# Patient Record
Sex: FEMALE | Race: WHITE | Employment: FULL TIME | URBAN - METROPOLITAN AREA
[De-identification: names, ages, dates, MRNs, and addresses within clinical notes are randomized per-mention and may not be internally consistent; named-entity substitution may affect disease eponyms.]

---

## 2017-05-24 ENCOUNTER — TRANSFERRED RECORDS (OUTPATIENT)
Dept: HEALTH INFORMATION MANAGEMENT | Facility: CLINIC | Age: 67
End: 2017-05-24

## 2017-08-01 ENCOUNTER — HOSPITAL ENCOUNTER (OUTPATIENT)
Facility: CLINIC | Age: 67
Setting detail: SPECIMEN
Discharge: HOME | End: 2017-08-01
Attending: INTERNAL MEDICINE | Admitting: INTERNAL MEDICINE
Payer: COMMERCIAL

## 2017-08-01 ENCOUNTER — ONCOLOGY VISIT (OUTPATIENT)
Dept: ONCOLOGY | Facility: CLINIC | Age: 67
End: 2017-08-01
Attending: INTERNAL MEDICINE
Payer: COMMERCIAL

## 2017-08-01 DIAGNOSIS — I82.90 VTE (VENOUS THROMBOEMBOLISM): ICD-10-CM

## 2017-08-01 LAB
ALBUMIN SERPL-MCNC: 3.8 G/DL (ref 3.4–5)
ALP SERPL-CCNC: 83 U/L (ref 40–150)
ALT SERPL W P-5'-P-CCNC: 34 U/L (ref 0–50)
ANION GAP SERPL CALCULATED.3IONS-SCNC: 5 MMOL/L (ref 3–14)
AST SERPL W P-5'-P-CCNC: 25 U/L (ref 0–45)
BASOPHILS # BLD AUTO: 0.1 10E9/L (ref 0–0.2)
BASOPHILS NFR BLD AUTO: 1.3 %
BILIRUB SERPL-MCNC: 0.5 MG/DL (ref 0.2–1.3)
BUN SERPL-MCNC: 15 MG/DL (ref 7–30)
CALCIUM SERPL-MCNC: 9.1 MG/DL (ref 8.5–10.1)
CHLORIDE SERPL-SCNC: 106 MMOL/L (ref 94–109)
CO2 SERPL-SCNC: 30 MMOL/L (ref 20–32)
CREAT SERPL-MCNC: 0.81 MG/DL (ref 0.52–1.04)
D DIMER PPP FEU-MCNC: 0.3 UG/ML FEU (ref 0–0.5)
DIFFERENTIAL METHOD BLD: ABNORMAL
EOSINOPHIL # BLD AUTO: 0.1 10E9/L (ref 0–0.7)
EOSINOPHIL NFR BLD AUTO: 2.6 %
ERYTHROCYTE [DISTWIDTH] IN BLOOD BY AUTOMATED COUNT: 12.8 % (ref 10–15)
GFR SERPL CREATININE-BSD FRML MDRD: 71 ML/MIN/1.7M2
GLUCOSE SERPL-MCNC: 97 MG/DL (ref 70–99)
HCT VFR BLD AUTO: 36.8 % (ref 35–47)
HGB BLD-MCNC: 12 G/DL (ref 11.7–15.7)
IMM GRANULOCYTES # BLD: 0 10E9/L (ref 0–0.4)
IMM GRANULOCYTES NFR BLD: 0.2 %
LYMPHOCYTES # BLD AUTO: 1.7 10E9/L (ref 0.8–5.3)
LYMPHOCYTES NFR BLD AUTO: 37.4 %
MCH RBC QN AUTO: 32.1 PG (ref 26.5–33)
MCHC RBC AUTO-ENTMCNC: 32.6 G/DL (ref 31.5–36.5)
MCV RBC AUTO: 98 FL (ref 78–100)
MONOCYTES # BLD AUTO: 0.4 10E9/L (ref 0–1.3)
MONOCYTES NFR BLD AUTO: 7.9 %
NEUTROPHILS # BLD AUTO: 2.3 10E9/L (ref 1.6–8.3)
NEUTROPHILS NFR BLD AUTO: 50.6 %
NRBC # BLD AUTO: 0 10*3/UL
NRBC BLD AUTO-RTO: 0 /100
PLATELET # BLD AUTO: 228 10E9/L (ref 150–450)
POTASSIUM SERPL-SCNC: 4.5 MMOL/L (ref 3.4–5.3)
PROT SERPL-MCNC: 6.5 G/DL (ref 6.8–8.8)
RBC # BLD AUTO: 3.74 10E12/L (ref 3.8–5.2)
SODIUM SERPL-SCNC: 141 MMOL/L (ref 133–144)
WBC # BLD AUTO: 4.6 10E9/L (ref 4–11)

## 2017-08-01 PROCEDURE — 36415 COLL VENOUS BLD VENIPUNCTURE: CPT

## 2017-08-01 PROCEDURE — 85379 FIBRIN DEGRADATION QUANT: CPT | Performed by: INTERNAL MEDICINE

## 2017-08-01 PROCEDURE — 80053 COMPREHEN METABOLIC PANEL: CPT | Performed by: INTERNAL MEDICINE

## 2017-08-01 PROCEDURE — 85025 COMPLETE CBC W/AUTO DIFF WBC: CPT | Performed by: INTERNAL MEDICINE

## 2017-08-01 NOTE — PROGRESS NOTES
Medical Assistant Note:  Keyur Mina presents today for Blood draw.    Patient seen by provider today: No.   present during visit today: Not Applicable.    Concerns: No Concerns.    Procedure:  Labs drawn: Yes.    Post Assessment:  Labs drawn without difficulty: Yes.    Discharge Plan:  Patient discharged in stable condition accompanied by: self.    Face to Face Time: 10 mins.    Angella Gracia

## 2017-08-01 NOTE — MR AVS SNAPSHOT
After Visit Summary   8/1/2017    Keyur Mina    MRN: [id number]           Patient Information     Date Of Birth          1950        Visit Information        Provider Department      8/1/2017 10:00 AM  ONCOLOGY NURSE [facility]        Today's Diagnoses     VTE (venous thromboembolism)           Follow-ups after your visit        Your next 10 appointments already scheduled     Aug 07, 2017 12:30 PM CDT   Return Visit with Marquita Walker MD   [facility] ([facility])    [facility]  [address]  [address]  [facility] [id number] [phone]              Who to contact     If you have questions or need follow up information about today's clinic visit or your schedule please contact [facility] directly at [phone].  Normal or non-critical lab and imaging results will be communicated to you by MyChart, letter or phone within 4 business days after the clinic has received the results. If you do not hear from us within 7 days, please contact the clinic through [employer] or phone. If you have a critical or abnormal lab result, we will notify you by phone as soon as possible.  Submit refill requests through [employer] or call your pharmacy and they will forward the refill request to us. Please allow 3 business days for your refill to be completed.          Additional Information About Your Visit        MyChart Information     [employer] gives you secure access to your electronic health record. If you see a primary care provider, you can also send messages to your care team and make appointments. If you have questions, please call your primary care clinic.  If you do not have a primary care provider, please call [phone] and they will assist you.        Care EveryWhere ID     This is your Care EveryWhere ID. This could be used by other organizations to access your [facility]  records  [id number]         Blood Pressure from Last 3 Encounters:   08/08/16 120/63   12/28/15 126/68   07/02/15 112/74    Weight from Last 3 Encounters:   08/08/16 63.5 kg (140 lb)   12/28/15 64.3 kg (141 lb 12.8 oz)   07/02/15 60.3 kg (133 lb)              We Performed the Following     CBC with platelets differential     Comprehensive metabolic panel     D dimer quantitative          Today's Medication Changes          These changes are accurate as of: 8/1/17 10:17 AM.  If you have any questions, ask your nurse or doctor.               These medicines have changed or have updated prescriptions.        Dose/Directions    levothyroxine 88 MCG tablet   Commonly known as:  SYNTHROID/LEVOTHROID   This may have changed:    - how much to take  - additional instructions   Used for:  Hypothyroidism        Dose:  88 mcg   Take 1 tablet (88 mcg) by mouth daily Please schedule appointment for further refills.   Quantity:  30 tablet   Refills:  0                Primary Care Provider Office Phone # Fax #    Deb Gordon [phone]       [facility] [address] [facility] [address]        Equal Access to Services     [facility]: Hadii aad ku hadasho Soomaali, waaxda luqadaha, qaybta kaalmada adeegyada, erwin youngblood . So [facility] [phone].    ATENCIÓN: Si habla español, tiene a douglass disposición servicios gratuitos de asistencia lingüística. Llame al [phone].    We comply with applicable federal civil rights laws and [place] laws. We do not discriminate on the basis of race, color, national origin, age, disability sex, sexual orientation or gender identity.            Thank you!     Thank you for choosing [facility]  for your care. Our goal is always to provide you with excellent care. Hearing back from our patients is one way we can continue to improve our services. Please take a few minutes to complete the  written survey that you may receive in the mail after your visit with us. Thank you!             Your Updated Medication List - Protect others around you: Learn how to safely use, store and throw away your medicines at www.disposemymeds.org.          This list is accurate as of: 8/1/17 10:17 AM.  Always use your most recent med list.                   Brand Name Dispense Instructions for use Diagnosis    aspirin 81 MG tablet      Take 162 mg by mouth daily        calcium-vitamin D 500-125 MG-UNIT Tabs      Take by mouth daily        cephALEXin 500 MG capsule    KEFLEX    30 capsule    Take 1 capsule (500 mg) by mouth 3 times daily    Cellulitis       enoxaparin 40 MG/0.4ML injection    LOVENOX    4 Syringe    Inject 0.4 mLs (40 mg) Subcutaneous daily    VTE (venous thromboembolism)       levothyroxine 88 MCG tablet    SYNTHROID/LEVOTHROID    30 tablet    Take 1 tablet (88 mcg) by mouth daily Please schedule appointment for further refills.    Hypothyroidism       omeprazole 40 MG capsule    priLOSEC    90 capsule    Take 1 capsule (40 mg) by mouth daily Take 30-60 minutes before a meal.    GERD (gastroesophageal reflux disease)       VITAMIN B 12 PO      Take by mouth daily

## 2017-08-07 ENCOUNTER — ONCOLOGY VISIT (OUTPATIENT)
Dept: ONCOLOGY | Facility: CLINIC | Age: 67
End: 2017-08-07
Payer: COMMERCIAL

## 2017-08-07 VITALS
DIASTOLIC BLOOD PRESSURE: 63 MMHG | OXYGEN SATURATION: 98 % | HEIGHT: 64 IN | BODY MASS INDEX: 24.28 KG/M2 | HEART RATE: 62 BPM | RESPIRATION RATE: 18 BRPM | WEIGHT: 142.2 LBS | TEMPERATURE: 97.1 F | SYSTOLIC BLOOD PRESSURE: 120 MMHG

## 2017-08-07 DIAGNOSIS — I82.90 VTE (VENOUS THROMBOEMBOLISM): Primary | ICD-10-CM

## 2017-08-07 PROCEDURE — 99213 OFFICE O/P EST LOW 20 MIN: CPT | Performed by: INTERNAL MEDICINE

## 2017-08-07 PROCEDURE — 99211 OFF/OP EST MAY X REQ PHY/QHP: CPT

## 2017-08-07 ASSESSMENT — PAIN SCALES - GENERAL: PAINLEVEL: NO PAIN (0)

## 2017-08-07 NOTE — PROGRESS NOTES
[facility] PHYSICIANS    HEMATOLOGY ONCOLOGY  FOLLOW-UP VISIT NOTE      PATIENT NAME: Keyur Mina MRN # [id number]    PCP- Dr.Christina Gordon  REASON FOR VISIT: DVT, leg, diagnosed on 03/25/2014. This was her second DVT in the same leg.     SUBJECTIVE Patient comes in for a follow up today. She has been feeling well. No new complaints today.     HEMATOLOGY ONCOLOGY HISTORY  Per Dr. Banda's previous note:  Keyur Mina is a 63-year-old patient who 2 years ago in [place] in 11/2011 had a left leg DVT below the knee. That was right after [place] and she has also switched to a very sedentary job. She was given Lovenox for 5 weeks and then placed on Coumadin for 6 months. Records show that on 11/23/2011, the common femoral, superficial femoral and popliteal veins were patent, with no evidence of thrombosis, and most of the calf veins were also patent, but there was a short segment of distended calf vein which contained low-level echoes and no evidence of flow, in keeping with a short segment of thrombosed vessel. The age of this could not be determined. She has also had some hypercoagulable workup in the past. On 03/12/2012, when she was off the Coumadin, she had a protein S total that was 69%, low-normal range, and a free protein S of 50%, slightly below normal. Factor V Leiden mutation was negative. D-dimer was positive. Antithrombin activity was normal along with free protein S and protein C activity. Prothrombin gene mutation was absent. A repeat venous Doppler on 03/25/2014 showed an acute short-segment occlusive thrombus in the left peroneal vein, slight irregularity of the distal femoral vein, either artifact or indicative of chronic nonocclusive DVT, and a superficial thrombus in the proximal greater saphenous vein. Other lab work reveals hemoglobin of 11.6, mildly low. white count, platelet count, differential was normal from  "01/31/2014, and a BMP was normal on that day.   REVIEW OF SYSTEMS  A complete review of systems was performed and found to be negative other then pertinent positives mentioned in HPI.    Past medical, social histories reviewed.    Meds- Reviewed.    PHYSICAL EXAM  /63 (Patient Position: Chair)  Pulse 62  Temp 97.1  F (36.2  C) (Tympanic)  Resp 18  Ht 1.626 m (5' 4\")  Wt 64.5 kg (142 lb 3.2 oz)  SpO2 98%  BMI 24.41 kg/m2  GEN: NAD  HEENT: PERRL, EOMI, no icterus, injection or pallor. Oropharynx is clear.  NECK: no cervical or supraclavicular lymphadenopathy  LUNGS: clear bilaterally  CV: regular, no murmurs.  ABDOMEN: soft, non-tender, non-distended, normal bowel sounds, no hepatosplenomegaly.  EXT: warm, well perfused, no edema  NEURO: alert  SKIN: no rashes    LABORATORY DATA AND IMAGING REVIEWED DURING THIS VISIT:  Recent Labs   Lab Test  08/01/17   1009  08/02/16   1356   NA  141  139   POTASSIUM  4.5  4.1   CHLORIDE  106  105   CO2  30  27   ANIONGAP  5  7   BUN  15  19   CR  0.81  0.79   GLC  97  94   MAYRA  9.1  8.4*     Recent Labs   Lab Test  08/01/17   1009  08/02/16   1356  12/22/15   1010   WBC  4.6  6.2  4.1   HGB  12.0  11.9  12.4   PLT  228  224  279   MCV  98  100  97   NEUTROPHIL  50.6  62.0  50.5     Recent Labs   Lab Test  08/01/17   1009  08/02/16   1356  12/22/15   1010   04/01/14   1530   BILITOTAL  0.5  0.4  0.4   < >  0.2   ALKPHOS  83  81  105   < >  137   ALT  34  25  37   < >  44   AST  25  22  26   < >  39   ALBUMIN  3.8  3.8  3.9   < >  4.2   LDH   --    --    --    --   601    < > = values in this interval not displayed.   Results for JOLIE CARRILLO (MRN [id number]) as of 8/7/2017 12:40   Ref. Range 6/19/2015 10:05 12/22/2015 10:10 8/2/2016 13:56 8/1/2017 10:09   D-Dimer Latest Ref Range: 0.0 - 0.50 ug/ml FEU <0.3 <0.3 <0.3... 0.3       ECOG PS: 0    ASSESSMENT  This is a 66-year-old patient with a recurrent left leg below-the-knee DVT. The first episode was in 11/2011 in " [place], provoked after a prolonged plane ride and it was a calf thrombus adequately treated with 6 months Coumadin. Hypercoagulable workup was done and reviewed and mentioned above. She had a second clot in the same extremity- March 2014. In addition to the left peroneal vein occlusive thrombus, there was probable chronic nonocclusive DVT in the distal femoral vein, and superficial thrombus in the proximal greater saphenous vein. Recent US shows resolution of clot with some chronic findings in left superficial femoral vein. Last ultrasound was in July of 2014. She was switched Aspirin. Repeat ultrasound in 2/15 while off coumadin did not show any clots. Normal protein C and S. Negative PNH. Normal D- dimer.  - Labs were reviewed with the patient   - She uses Lovenox 40 mg SQ daily for situation with high risk of thrombosis, surgery, long travel etc.   PLAN  1- RTC MD 1 year  2- Continue Aspirin  3- Labs before next visit- CBC diff, CMP, D-Dimer    Marquita Walker MD  8/7/2017    CC: Dr.Christina Gordon

## 2017-08-07 NOTE — PATIENT INSTRUCTIONS
1- RTC MD 1 year Scheduled  Jayna CORTES    2- Continue Aspirin  3- Labs before next visit- CBC diff, CMP, D-Dimer Scheduled  Jayna CORTES  AVS given to patient

## 2017-08-07 NOTE — NURSING NOTE
"Oncology Rooming Note    August 7, 2017 12:29 PM   Keyur Mina is a 66 year old female who presents for:    Chief Complaint   Patient presents with     Oncology Clinic Visit     Follow-up     Initial Vitals: /63 (Patient Position: Chair)  Pulse 62  Temp 97.1  F (36.2  C) (Tympanic)  Resp 18  Ht 1.626 m (5' 4\")  Wt 64.5 kg (142 lb 3.2 oz)  SpO2 98%  BMI 24.41 kg/m2 Estimated body mass index is 24.41 kg/(m^2) as calculated from the following:    Height as of this encounter: 1.626 m (5' 4\").    Weight as of this encounter: 64.5 kg (142 lb 3.2 oz). Body surface area is 1.71 meters squared.  No Pain (0) Comment: Data Unavailable   No LMP recorded. Patient is postmenopausal.  Allergies reviewed: Yes  Medications reviewed: Yes    Medications: MEDICATION REFILLS NEEDED TODAY. Provider was notified.  Pharmacy name entered into [facility]:    [facility] [address] [facility] [address] AT [facility] [address] & [facility] [address] [facility] [address] AT [facility] [address]  [facility] - [address]    Clinical concerns: Follow-up    8 minutes for nursing intake (face to face time)     Kelsy Membreno  DISCHARGE PLAN:  Next appointments: See patient instruction section  Departure Mode: Ambulatory  Accompanied by: self  0 minutes for nursing discharge (face to face time)   Kelsy Membreno                "

## 2017-08-07 NOTE — MR AVS SNAPSHOT
After Visit Summary   8/7/2017    Keyur Mina    MRN: [id number]           Patient Information     Date Of Birth          1950        Visit Information        Provider Department      8/7/2017 12:30 PM Marquita Walker MD [facility]      Today's Diagnoses     VTE (venous thromboembolism)    -  1      Care Instructions    1- RTC MD 1 year Scheduled  Jayna CORTES    2- Continue Aspirin  3- Labs before next visit- CBC diff, CMP, D-Dimer Scheduled  Jayna CORTES  AVS given to patient            Follow-ups after your visit        Your next 10 appointments already scheduled     Jul 30, 2018  9:00 AM CDT   Return Visit with  ONCOLOGY NURSE   [facility])    [facility]  [address]  [facility] [address] [phone]            Aug 06, 2018 12:30 PM CDT   Return Visit with Marquita Walker MD   [facility] ([facility])    [facility]  [address]  [facility] [id number] [phone]              Future tests that were ordered for you today     Open Future Orders        Priority Expected Expires Ordered    CBC with platelets differential Routine  8/7/2018 8/7/2017    Comprehensive metabolic panel Routine  8/7/2018 8/7/2017    D dimer quantitative Routine  8/7/2018 8/7/2017            Who to contact     If you have questions or need follow up information about today's clinic visit or your schedule please contact [facility] directly at [phone].  Normal or non-critical lab and imaging results will be communicated to you by MyChart, letter or phone within 4 business days after the clinic has received the results. If you do not hear from us within 7 days, please contact the clinic through MyChart or phone. If you have a critical or abnormal lab result, we will notify you by phone as soon as  "possible.  Submit refill requests through [employer] or call your pharmacy and they will forward the refill request to us. Please allow 3 business days for your refill to be completed.          Additional Information About Your Visit        [employer] Information     [employer] gives you secure access to your electronic health record. If you see a primary care provider, you can also send messages to your care team and make appointments. If you have questions, please call your primary care clinic.  If you do not have a primary care provider, please call [phone] and they will assist you.        Care EveryWhere ID     This is your Care EveryWhere ID. This could be used by other organizations to access your [facility] medical records  [id number]        Your Vitals Were     Pulse Temperature Respirations Height Pulse Oximetry BMI (Body Mass Index)    62 97.1  F (36.2  C) (Tympanic) 18 1.626 m (5' 4\") 98% 24.41 kg/m2       Blood Pressure from Last 3 Encounters:   08/07/17 120/63   08/08/16 120/63   12/28/15 126/68    Weight from Last 3 Encounters:   08/07/17 64.5 kg (142 lb 3.2 oz)   08/08/16 63.5 kg (140 lb)   12/28/15 64.3 kg (141 lb 12.8 oz)                 Today's Medication Changes          These changes are accurate as of: 8/7/17 12:55 PM.  If you have any questions, ask your nurse or doctor.               These medicines have changed or have updated prescriptions.        Dose/Directions    levothyroxine 88 MCG tablet   Commonly known as:  SYNTHROID/LEVOTHROID   This may have changed:    - how much to take  - additional instructions   Used for:  Hypothyroidism        Dose:  88 mcg   Take 1 tablet (88 mcg) by mouth daily Please schedule appointment for further refills.   Quantity:  30 tablet   Refills:  0            Where to get your medicines      [facility] - [place], [place] - [address] AT [facility] & [place]  [address], [facility] " [address]     Phone:  [phone]     enoxaparin 40 MG/0.4ML injection                Primary Care Provider Office Phone # Fax #    Deb Gordon [phone]       [facility] [address]   [facility] [address]        Equal Access to Services     [facility]: Hadii aad ku hadasho Soomaali, waaxda luqadaha, qaybta kaalmada adeegyada, waxay idiin hayaan adeeg kharash la'aan . So [facility] [phone].    ATENCIÓN: Si habla español, tiene a douglass disposición servicios gratuitos de asistencia lingüística. Ronaldo al [phone].    We comply with applicable federal civil rights laws and [place] laws. We do not discriminate on the basis of race, color, national origin, age, disability sex, sexual orientation or gender identity.            Thank you!     Thank you for choosing [facility]  for your care. Our goal is always to provide you with excellent care. Hearing back from our patients is one way we can continue to improve our services. Please take a few minutes to complete the written survey that you may receive in the mail after your visit with us. Thank you!             Your Updated Medication List - Protect others around you: Learn how to safely use, store and throw away your medicines at www.disposemymeds.org.          This list is accurate as of: 8/7/17 12:55 PM.  Always use your most recent med list.                   Brand Name Dispense Instructions for use Diagnosis    aspirin 81 MG tablet      Take 162 mg by mouth daily        calcium-vitamin D 500-125 MG-UNIT Tabs      Take by mouth daily        cephALEXin 500 MG capsule    KEFLEX    30 capsule    Take 1 capsule (500 mg) by mouth 3 times daily    Cellulitis       enoxaparin 40 MG/0.4ML injection    LOVENOX    4 Syringe    Inject 0.4 mLs (40 mg) Subcutaneous daily    VTE (venous thromboembolism)       levothyroxine 88 MCG tablet    SYNTHROID/LEVOTHROID    30 tablet    Take 1 tablet (88  mcg) by mouth daily Please schedule appointment for further refills.    Hypothyroidism       omeprazole 40 MG capsule    priLOSEC    90 capsule    Take 1 capsule (40 mg) by mouth daily Take 30-60 minutes before a meal.    GERD (gastroesophageal reflux disease)       VITAMIN B 12 PO      Take by mouth daily

## 2018-08-20 ENCOUNTER — HOSPITAL ENCOUNTER (OUTPATIENT)
Facility: CLINIC | Age: 68
Setting detail: SPECIMEN
Discharge: HOME | End: 2018-08-20
Attending: INTERNAL MEDICINE | Admitting: INTERNAL MEDICINE
Payer: COMMERCIAL

## 2018-08-20 ENCOUNTER — ONCOLOGY VISIT (OUTPATIENT)
Dept: ONCOLOGY | Facility: CLINIC | Age: 68
End: 2018-08-20
Attending: INTERNAL MEDICINE
Payer: COMMERCIAL

## 2018-08-20 DIAGNOSIS — I82.90 VTE (VENOUS THROMBOEMBOLISM): ICD-10-CM

## 2018-08-20 LAB
ALBUMIN SERPL-MCNC: 3.9 G/DL (ref 3.4–5)
ALP SERPL-CCNC: 94 U/L (ref 40–150)
ALT SERPL W P-5'-P-CCNC: 32 U/L (ref 0–50)
ANION GAP SERPL CALCULATED.3IONS-SCNC: 3 MMOL/L (ref 3–14)
AST SERPL W P-5'-P-CCNC: 22 U/L (ref 0–45)
BASOPHILS # BLD AUTO: 0.1 10E9/L (ref 0–0.2)
BASOPHILS NFR BLD AUTO: 2 %
BILIRUB SERPL-MCNC: 0.4 MG/DL (ref 0.2–1.3)
BUN SERPL-MCNC: 14 MG/DL (ref 7–30)
CALCIUM SERPL-MCNC: 8.4 MG/DL (ref 8.5–10.1)
CHLORIDE SERPL-SCNC: 108 MMOL/L (ref 94–109)
CO2 SERPL-SCNC: 30 MMOL/L (ref 20–32)
CREAT SERPL-MCNC: 0.84 MG/DL (ref 0.52–1.04)
D DIMER PPP FEU-MCNC: 0.3 UG/ML FEU (ref 0–0.5)
DIFFERENTIAL METHOD BLD: ABNORMAL
EOSINOPHIL # BLD AUTO: 0.1 10E9/L (ref 0–0.7)
EOSINOPHIL NFR BLD AUTO: 2.6 %
ERYTHROCYTE [DISTWIDTH] IN BLOOD BY AUTOMATED COUNT: 12.8 % (ref 10–15)
GFR SERPL CREATININE-BSD FRML MDRD: 68 ML/MIN/1.7M2
GLUCOSE SERPL-MCNC: 104 MG/DL (ref 70–99)
HCT VFR BLD AUTO: 38.5 % (ref 35–47)
HGB BLD-MCNC: 12.4 G/DL (ref 11.7–15.7)
IMM GRANULOCYTES # BLD: 0 10E9/L (ref 0–0.4)
IMM GRANULOCYTES NFR BLD: 0.2 %
LYMPHOCYTES # BLD AUTO: 1.6 10E9/L (ref 0.8–5.3)
LYMPHOCYTES NFR BLD AUTO: 34.1 %
MCH RBC QN AUTO: 32.5 PG (ref 26.5–33)
MCHC RBC AUTO-ENTMCNC: 32.2 G/DL (ref 31.5–36.5)
MCV RBC AUTO: 101 FL (ref 78–100)
MONOCYTES # BLD AUTO: 0.3 10E9/L (ref 0–1.3)
MONOCYTES NFR BLD AUTO: 6.6 %
NEUTROPHILS # BLD AUTO: 2.5 10E9/L (ref 1.6–8.3)
NEUTROPHILS NFR BLD AUTO: 54.5 %
NRBC # BLD AUTO: 0 10*3/UL
NRBC BLD AUTO-RTO: 0 /100
PLATELET # BLD AUTO: 295 10E9/L (ref 150–450)
POTASSIUM SERPL-SCNC: 4.3 MMOL/L (ref 3.4–5.3)
PROT SERPL-MCNC: 6.6 G/DL (ref 6.8–8.8)
RBC # BLD AUTO: 3.82 10E12/L (ref 3.8–5.2)
SODIUM SERPL-SCNC: 141 MMOL/L (ref 133–144)
WBC # BLD AUTO: 4.6 10E9/L (ref 4–11)

## 2018-08-20 PROCEDURE — 85025 COMPLETE CBC W/AUTO DIFF WBC: CPT | Performed by: INTERNAL MEDICINE

## 2018-08-20 PROCEDURE — 36415 COLL VENOUS BLD VENIPUNCTURE: CPT

## 2018-08-20 PROCEDURE — 80053 COMPREHEN METABOLIC PANEL: CPT | Performed by: INTERNAL MEDICINE

## 2018-08-20 PROCEDURE — 85379 FIBRIN DEGRADATION QUANT: CPT | Performed by: INTERNAL MEDICINE

## 2018-08-20 NOTE — MR AVS SNAPSHOT
After Visit Summary   8/20/2018    Keyur Mina    MRN: [id number]           Patient Information     Date Of Birth          1950        Visit Information        Provider Department      8/20/2018 10:00 AM RH ONCOLOGY NURSE [facility]        Today's Diagnoses     VTE (venous thromboembolism)           Follow-ups after your visit        Your next 10 appointments already scheduled     Aug 20, 2018 10:00 AM CDT   Return Visit with RH ONCOLOGY NURSE   [facility]    [facility]  [address]  [facility] [id number] [phone]            Aug 27, 2018  3:30 PM CDT   Return Visit with Marquita Walker MD   [facility]    [facility]  [address]  [facility] [id number] [phone]              Who to contact     If you have questions or need follow up information about today's clinic visit or your schedule please contact [facility] directly at [phone].  Normal or non-critical lab and imaging results will be communicated to you by Guanrihart, letter or phone within 4 business days after the clinic has received the results. If you do not hear from us within 7 days, please contact the clinic through Guanrihart or phone. If you have a critical or abnormal lab result, we will notify you by phone as soon as possible.  Submit refill requests through Digital Harbor or call your pharmacy and they will forward the refill request to us. Please allow 3 business days for your refill to be completed.          Additional Information About Your Visit        MyChart Information     Digital Harbor gives you secure access to your electronic health record. If you see a primary care provider, you can also send messages to your care team and make appointments. If you have questions, please call your primary care  clinic.  If you do not have a primary care provider, please call [phone] and they will assist you.        Care EveryWhere ID     This is your Care EveryWhere ID. This could be used by other organizations to access your [place] medical records  [id number]         Blood Pressure from Last 3 Encounters:   08/07/17 120/63   08/08/16 120/63   12/28/15 126/68    Weight from Last 3 Encounters:   08/07/17 64.5 kg (142 lb 3.2 oz)   08/08/16 63.5 kg (140 lb)   12/28/15 64.3 kg (141 lb 12.8 oz)              We Performed the Following     CBC with platelets differential     Comprehensive metabolic panel     D dimer quantitative          Today's Medication Changes          These changes are accurate as of 8/20/18  9:56 AM.  If you have any questions, ask your nurse or doctor.               These medicines have changed or have updated prescriptions.        Dose/Directions    levothyroxine 88 MCG tablet   Commonly known as:  SYNTHROID/LEVOTHROID   This may have changed:    - how much to take  - additional instructions   Used for:  Hypothyroidism        Dose:  88 mcg   Take 1 tablet (88 mcg) by mouth daily Please schedule appointment for further refills.   Quantity:  30 tablet   Refills:  0                Primary Care Provider Office Phone # Fax #    Deb Gordon [phone]       [facility] [place] [address] [facility] [address]        Equal Access to Services     TAMIA GARZA : Hadii aad ku hadasho Sosenia, waaxda luqadaha, qaybta kaalmada flynn, erwin youngblood . So [facility] [phone].    ATENCIÓN: Si habla español, tiene a douglass disposición servicios gratuitos de asistencia lingüística. Ronaldo al [phone].    We comply with applicable federal civil rights laws and [place] laws. We do not discriminate on the basis of race, color, national origin, age, disability, sex, sexual orientation, or gender identity.            Thank you!     Thank you  for choosing [facility]  for your care. Our goal is always to provide you with excellent care. Hearing back from our patients is one way we can continue to improve our services. Please take a few minutes to complete the written survey that you may receive in the mail after your visit with us. Thank you!             Your Updated Medication List - Protect others around you: Learn how to safely use, store and throw away your medicines at www.disposemymeds.org.          This list is accurate as of 8/20/18  9:56 AM.  Always use your most recent med list.                   Brand Name Dispense Instructions for use Diagnosis    aspirin 81 MG tablet      Take 162 mg by mouth daily        calcium-vitamin D 500-125 MG-UNIT Tabs      Take by mouth daily        cephALEXin 500 MG capsule    KEFLEX    30 capsule    Take 1 capsule (500 mg) by mouth 3 times daily    Cellulitis       enoxaparin 40 MG/0.4ML injection    LOVENOX    4 Syringe    Inject 0.4 mLs (40 mg) Subcutaneous daily    VTE (venous thromboembolism)       levothyroxine 88 MCG tablet    SYNTHROID/LEVOTHROID    30 tablet    Take 1 tablet (88 mcg) by mouth daily Please schedule appointment for further refills.    Hypothyroidism       omeprazole 40 MG capsule    priLOSEC    90 capsule    Take 1 capsule (40 mg) by mouth daily Take 30-60 minutes before a meal.    GERD (gastroesophageal reflux disease)       VITAMIN B 12 PO      Take by mouth daily

## 2018-08-20 NOTE — LETTER
8/20/2018         RE: Keyur Mina  [address] [facility] [address]        Dear Colleague,    Thank you for referring your patient, Keyur Mina, to the [facility]. Please see a copy of my visit note below.    See nursing Note    Again, thank you for allowing me to participate in the care of your patient.        Sincerely,        Jason Oncology Nurse

## 2018-08-20 NOTE — NURSING NOTE
Medical Assistant Note:  Keyur Mina presents today for Blood Draw.    Patient seen by provider today: No.   present during visit today: Not Applicable.    Concerns: No Concerns.    Procedure:  Lab draw site: Right arm, Needle type: Butterfly, Gauge: 23.    Post Assessment:  Labs drawn without difficulty: Yes.    Discharge Plan:  Departure Mode: Ambulatory.    Face to Face Time: 10.    Pamela Rice

## 2018-08-27 ENCOUNTER — ONCOLOGY VISIT (OUTPATIENT)
Dept: ONCOLOGY | Facility: CLINIC | Age: 68
End: 2018-08-27
Attending: INTERNAL MEDICINE
Payer: COMMERCIAL

## 2018-08-27 VITALS
WEIGHT: 144.4 LBS | BODY MASS INDEX: 24.65 KG/M2 | OXYGEN SATURATION: 99 % | SYSTOLIC BLOOD PRESSURE: 124 MMHG | HEIGHT: 64 IN | HEART RATE: 62 BPM | RESPIRATION RATE: 16 BRPM | DIASTOLIC BLOOD PRESSURE: 62 MMHG | TEMPERATURE: 98.1 F

## 2018-08-27 DIAGNOSIS — I82.90 VTE (VENOUS THROMBOEMBOLISM): ICD-10-CM

## 2018-08-27 PROCEDURE — G0463 HOSPITAL OUTPT CLINIC VISIT: HCPCS

## 2018-08-27 PROCEDURE — 99213 OFFICE O/P EST LOW 20 MIN: CPT | Performed by: INTERNAL MEDICINE

## 2018-08-27 ASSESSMENT — PAIN SCALES - GENERAL: PAINLEVEL: NO PAIN (0)

## 2018-08-27 NOTE — PROGRESS NOTES
[facility]    HEMATOLOGY ONCOLOGY  FOLLOW-UP VISIT NOTE      PATIENT NAME: Keyur Mina MRN # [id number]    PCP- Dr.Christina Gordon  REASON FOR VISIT: DVT, leg, diagnosed on 03/25/2014. This was her second DVT in the same leg.     SUBJECTIVE Patient comes in for a follow up today. She has been feeling well. No new symptoms.     HEMATOLOGY ONCOLOGY HISTORY  In [place] in 11/2011 she had a left leg DVT below the knee. That was right after a [place] and she has also switched to a very sedentary job. She was given Lovenox for 5 weeks and then placed on Coumadin for 6 months. Records show that on 11/23/2011, the common femoral, superficial femoral and popliteal veins were patent, with no evidence of thrombosis, and most of the calf veins were also patent, but there was a short segment of distended calf vein which contained low-level echoes and no evidence of flow, in keeping with a short segment of thrombosed vessel. The age of this could not be determined. She has also had some hypercoagulable workup in the past. On 03/12/2012, when she was off the Coumadin, she had a protein S total that was 69%, low-normal range, and a free protein S of 50%, slightly below normal. Factor V Leiden mutation was negative. D-dimer was positive. Antithrombin activity was normal along with free protein S and protein C activity. Prothrombin gene mutation was absent. A repeat venous Doppler on 03/25/2014 showed an acute short-segment occlusive thrombus in the left peroneal vein, slight irregularity of the distal femoral vein, either artifact or indicative of chronic nonocclusive DVT, and a superficial thrombus in the proximal greater saphenous vein. Other lab work reveals hemoglobin of 11.6, mildly low. white count, platelet count, differential was normal from 01/31/2014, and a BMP was normal on that day.   REVIEW OF SYSTEMS  A complete review of systems was  "performed and found to be negative other then pertinent positives mentioned in HPI.    Past medical, social histories reviewed.    Meds- Reviewed.    PHYSICAL EXAM  /62  Pulse 62  Temp 98.1  F (36.7  C) (Tympanic)  Resp 16  Ht 1.626 m (5' 4\")  Wt 65.5 kg (144 lb 6.4 oz)  SpO2 99%  BMI 24.79 kg/m2  CONSTITUTIONAL: Sitting comfortably.   HEENT: Pupils are equal. Oropharynx is clear.   NECK: No cervical or supraclavicular lymphadenopathy.   RESPIRATORY: Clear bilaterally.   CARD/VASC: S1, S2, regular.   GI: Soft, nontender, nondistended, no hepatosplenomegaly.   MUSKULOSKELETAL: Warm, well perfused.   NEUROLOGIC: Alert, awake.   INTEGUMENT: No rash.   LYMPHATICS: No edema.   PSYCH: Mood and affect was normal.    LABORATORY DATA AND IMAGING REVIEWED DURING THIS VISIT:  Recent Labs   Lab Test  08/20/18   0951  08/01/17   1009   NA  141  141   POTASSIUM  4.3  4.5   CHLORIDE  108  106   CO2  30  30   ANIONGAP  3  5   BUN  14  15   CR  0.84  0.81   GLC  104*  97   MAYRA  8.4*  9.1     Recent Labs   Lab Test  08/20/18   0951  08/01/17   1009  08/02/16   1356   WBC  4.6  4.6  6.2   HGB  12.4  12.0  11.9   PLT  295  228  224   MCV  101*  98  100   NEUTROPHIL  54.5  50.6  62.0     Recent Labs   Lab Test  08/20/18   0951  08/01/17   1009  08/02/16   1356   04/01/14   1530   BILITOTAL  0.4  0.5  0.4   < >  0.2   ALKPHOS  94  83  81   < >  137   ALT  32  34  25   < >  44   AST  22  25  22   < >  39   ALBUMIN  3.9  3.8  3.8   < >  4.2   LDH   --    --    --    --   601    < > = values in this interval not displayed.     Results for JOLIE CARRILLO (MRN [id number]) as of 8/27/2018 15:23   Ref. Range 12/22/2015 10:10 8/2/2016 13:56 8/1/2017 10:09 8/20/2018 09:51   D-Dimer Latest Ref Range: 0.0 - 0.50 ug/ml FEU <0.3 <0.3... 0.3 0.3     ECOG PS: 0    ASSESSMENT  This is a 67-year-old patient with a recurrent left leg below-the-knee DVT. The first episode was in 11/2011 in [place], provoked after a prolonged plane ride and " it was a calf thrombus adequately treated with 6 months Coumadin. Hypercoagulable workup was done and reviewed and mentioned above. She had a second clot in the same extremity- March 2014. In addition to the left peroneal vein occlusive thrombus, there was probable chronic nonocclusive DVT in the distal femoral vein, and superficial thrombus in the proximal greater saphenous vein. Recent US shows resolution of clot with some chronic findings in left superficial femoral vein. Last ultrasound was in July of 2014. She was switched Aspirin. Repeat ultrasound in 2/15 while off coumadin did not show any clots. Normal protein C and S. Negative PNH. Normal D- dimer.  - Labs were reviewed with the patient. Nomral d-dimer.   - She uses Lovenox 40 mg SQ daily for situation with high risk of thrombosis, surgery, long travel etc.   PLAN  1- RTC MD 1 year  2- Continue Aspirin  3- Labs before next visit- CBC diff, CMP, D-Dimer    Marquita Walker MD  8/27/2018    CC: Dr.Christina Gordon

## 2018-08-27 NOTE — MR AVS SNAPSHOT
After Visit Summary   8/27/2018    Keyur Mina    MRN: [id number]           Patient Information     Date Of Birth          1950        Visit Information        Provider Department      8/27/2018 3:30 PM Marquita aWlker MD [facility] Oncology [employer]      Today's Diagnoses     VTE (venous thromboembolism)          Care Instructions    1- RTC MD 1 year  2- Continue Aspirin  3- Labs before next visit- CBC diff, CMP, D-Dimer          Follow-ups after your visit        Your next 10 appointments already scheduled     Aug 19, 2019  9:00 AM CDT   Return Visit with RH ONCOLOGY NURSE   [facility]    [facility]  [address]  [facility] [address] [phone]            Aug 26, 2019  1:00 PM CDT   Return Visit with Marquita Walker MD   [facility])    [facility]  [address]  [facility] [address]   [phone]              Future tests that were ordered for you today     Open Future Orders        Priority Expected Expires Ordered    CBC with platelets differential Routine  8/27/2019 8/27/2018    Comprehensive metabolic panel Routine  8/27/2019 8/27/2018    D dimer quantitative Routine  8/27/2019 8/27/2018            Who to contact     If you have questions or need follow up information about today's clinic visit or your schedule please contact [facility] directly at [phone].  Normal or non-critical lab and imaging results will be communicated to you by MyChart, letter or phone within 4 business days after the clinic has received the results. If you do not hear from us within 7 days, please contact the clinic through MyChart or phone. If you have a critical or abnormal lab result, we will notify you by phone as soon as possible.  Submit refill requests through Red Seraphimhart or call your  "pharmacy and they will forward the refill request to us. Please allow 3 business days for your refill to be completed.          Additional Information About Your Visit        Brainswayhart Information     Craneware gives you secure access to your electronic health record. If you see a primary care provider, you can also send messages to your care team and make appointments. If you have questions, please call your primary care clinic.  If you do not have a primary care provider, please call [phone] and they will assist you.        Care EveryWhere ID     This is your Care EveryWhere ID. This could be used by other organizations to access your [place] medical records  [id number]        Your Vitals Were     Pulse Temperature Respirations Height Pulse Oximetry BMI (Body Mass Index)    62 98.1  F (36.7  C) (Tympanic) 16 1.626 m (5' 4\") 99% 24.79 kg/m2       Blood Pressure from Last 3 Encounters:   08/27/18 124/62   08/07/17 120/63   08/08/16 120/63    Weight from Last 3 Encounters:   08/27/18 65.5 kg (144 lb 6.4 oz)   08/07/17 64.5 kg (142 lb 3.2 oz)   08/08/16 63.5 kg (140 lb)                 Today's Medication Changes          These changes are accurate as of 8/27/18  3:41 PM.  If you have any questions, ask your nurse or doctor.               These medicines have changed or have updated prescriptions.        Dose/Directions    levothyroxine 88 MCG tablet   Commonly known as:  SYNTHROID/LEVOTHROID   This may have changed:    - how much to take  - additional instructions   Used for:  Hypothyroidism        Dose:  88 mcg   Take 1 tablet (88 mcg) by mouth daily Please schedule appointment for further refills.   Quantity:  30 tablet   Refills:  0            Where to get your medicines      These medications were sent to [id number] - [place], [place] - [address] AT [facility] [place] [facility]  [address], [facility] [address]     Phone:  [phone]     enoxaparin 40 MG/0.4ML " injection                Primary Care Provider Office Phone # Fax #    Deb Gordon [phone]       [facility] [place] [address]   [facility] [address]        Equal Access to Services     AUGUSTO GARZA : Hadii fermin whatley lindao Soomaali, waaxda luqadaha, qaybta kaalmada adeegyada, waxlizeth jose manuelin hayaan sendyjed dobson rylie gonzalez. So [facility] [phone].    ATENCIÓN: Si habla español, tiene a douglass disposición servicios gratuitos de asistencia lingüística. [facility] [phone].    We comply with applicable federal civil rights laws and [place] laws. We do not discriminate on the basis of race, color, national origin, age, disability, sex, sexual orientation, or gender identity.            Thank you!     Thank you for choosing [facility]  for your care. Our goal is always to provide you with excellent care. Hearing back from our patients is one way we can continue to improve our services. Please take a few minutes to complete the written survey that you may receive in the mail after your visit with us. Thank you!             Your Updated Medication List - Protect others around you: Learn how to safely use, store and throw away your medicines at www.disposemymeds.org.          This list is accurate as of 8/27/18  3:41 PM.  Always use your most recent med list.                   Brand Name Dispense Instructions for use Diagnosis    aspirin 81 MG tablet      Take 162 mg by mouth daily        calcium-vitamin D 500-125 MG-UNIT Tabs      Take by mouth daily        cephALEXin 500 MG capsule    KEFLEX    30 capsule    Take 1 capsule (500 mg) by mouth 3 times daily    Cellulitis       enoxaparin 40 MG/0.4ML injection    LOVENOX    4 Syringe    Inject 0.4 mLs (40 mg) Subcutaneous daily    VTE (venous thromboembolism)       levothyroxine 88 MCG tablet    SYNTHROID/LEVOTHROID    30 tablet    Take 1 tablet (88 mcg) by mouth daily Please schedule appointment for further  refills.    Hypothyroidism       omeprazole 40 MG capsule    priLOSEC    90 capsule    Take 1 capsule (40 mg) by mouth daily Take 30-60 minutes before a meal.    GERD (gastroesophageal reflux disease)       VITAMIN B 12 PO      Take by mouth daily

## 2018-08-27 NOTE — NURSING NOTE
"Oncology Rooming Note    August 27, 2018 3:30 PM   Keyur Mina is a 67 year old female who presents for:    Chief Complaint   Patient presents with     Oncology Clinic Visit     VTE (venous thromboembolism)     Initial Vitals: /62  Pulse 62  Temp 98.1  F (36.7  C) (Tympanic)  Resp 16  Ht 1.626 m (5' 4\")  Wt 65.5 kg (144 lb 6.4 oz)  SpO2 99%  BMI 24.79 kg/m2 Estimated body mass index is 24.79 kg/(m^2) as calculated from the following:    Height as of this encounter: 1.626 m (5' 4\").    Weight as of this encounter: 65.5 kg (144 lb 6.4 oz). Body surface area is 1.72 meters squared.  No Pain (0) Comment: Data Unavailable   No LMP recorded. Patient is postmenopausal.  Allergies reviewed: Yes  Medications reviewed: Yes    Medications: MEDICATION REFILLS NEEDED TODAY. Provider was notified. [employer]  Pharmacy name entered into [facility]:    [facility] [address] [facility] [place] [address] [facility] [address] [facility] [address] [facility] [address] [facility] [address]  [facility] - [address] DRUG [facility] [id number] [place] [address] [address] AT [facility] [address]    Clinical concerns: VTE (venous thromboembolism)     8 minutes for nursing intake (face to face time)     Pamela Rice CMA            DISCHARGE PLAN:  Next appointments: See patient instruction section  Departure Mode: Ambulatory  Accompanied by: self  0 minutes for nursing discharge (face to face time)   Pamela Rice CMA      "

## 2018-08-27 NOTE — LETTER
8/27/2018         RE: Keyur Mina  [id number] [facility] [id number]        Dear Colleague,    Thank you for referring your patient, Keyur Mina, to the [facility]. Please see a copy of my visit note below.    [facility]    HEMATOLOGY ONCOLOGY  FOLLOW-UP VISIT NOTE      PATIENT NAME: Keyur Mina MRN # [id number]    PCP- Dr.Christina Gordon  REASON FOR VISIT: DVT, leg, diagnosed on 03/25/2014. This was her second DVT in the same leg.     SUBJECTIVE Patient comes in for a follow up today. She has been feeling well. No new symptoms.     HEMATOLOGY ONCOLOGY HISTORY  In [place] in 11/2011 she had a left leg DVT below the knee. That was right after [place] and she has also switched to a very sedentary job. She was given Lovenox for 5 weeks and then placed on Coumadin for 6 months. Records show that on 11/23/2011, the common femoral, superficial femoral and popliteal veins were patent, with no evidence of thrombosis, and most of the calf veins were also patent, but there was a short segment of distended calf vein which contained low-level echoes and no evidence of flow, in keeping with a short segment of thrombosed vessel. The age of this could not be determined. She has also had some hypercoagulable workup in the past. On 03/12/2012, when she was off the Coumadin, she had a protein S total that was 69%, low-normal range, and a free protein S of 50%, slightly below normal. Factor V Leiden mutation was negative. D-dimer was positive. Antithrombin activity was normal along with free protein S and protein C activity. Prothrombin gene mutation was absent. A repeat venous Doppler on 03/25/2014 showed an acute short-segment occlusive thrombus in the left peroneal vein, slight irregularity of the distal femoral vein, either artifact or indicative of chronic nonocclusive DVT, and a superficial thrombus  "in the proximal greater saphenous vein. Other lab work reveals hemoglobin of 11.6, mildly low. white count, platelet count, differential was normal from 01/31/2014, and a BMP was normal on that day.   REVIEW OF SYSTEMS  A complete review of systems was performed and found to be negative other then pertinent positives mentioned in HPI.    Past medical, social histories reviewed.    Meds- Reviewed.    PHYSICAL EXAM  /62  Pulse 62  Temp 98.1  F (36.7  C) (Tympanic)  Resp 16  Ht 1.626 m (5' 4\")  Wt 65.5 kg (144 lb 6.4 oz)  SpO2 99%  BMI 24.79 kg/m2  CONSTITUTIONAL: Sitting comfortably.   HEENT: Pupils are equal. Oropharynx is clear.   NECK: No cervical or supraclavicular lymphadenopathy.   RESPIRATORY: Clear bilaterally.   CARD/VASC: S1, S2, regular.   GI: Soft, nontender, nondistended, no hepatosplenomegaly.   MUSKULOSKELETAL: Warm, well perfused.   NEUROLOGIC: Alert, awake.   INTEGUMENT: No rash.   LYMPHATICS: No edema.   PSYCH: Mood and affect was normal.    LABORATORY DATA AND IMAGING REVIEWED DURING THIS VISIT:  Recent Labs   Lab Test  08/20/18   0951  08/01/17   1009   NA  141  141   POTASSIUM  4.3  4.5   CHLORIDE  108  106   CO2  30  30   ANIONGAP  3  5   BUN  14  15   CR  0.84  0.81   GLC  104*  97   MAYRA  8.4*  9.1     Recent Labs   Lab Test  08/20/18   0951  08/01/17   1009  08/02/16   1356   WBC  4.6  4.6  6.2   HGB  12.4  12.0  11.9   PLT  295  228  224   MCV  101*  98  100   NEUTROPHIL  54.5  50.6  62.0     Recent Labs   Lab Test  08/20/18   0951  08/01/17   1009  08/02/16   1356   04/01/14   1530   BILITOTAL  0.4  0.5  0.4   < >  0.2   ALKPHOS  94  83  81   < >  137   ALT  32  34  25   < >  44   AST  22  25  22   < >  39   ALBUMIN  3.9  3.8  3.8   < >  4.2   LDH   --    --    --    --   601    < > = values in this interval not displayed.     Results for JOLIE CARRILLO (MRN [id number]) as of 8/27/2018 15:23   Ref. Range 12/22/2015 10:10 8/2/2016 13:56 8/1/2017 10:09 8/20/2018 09:51   D-Dimer " Latest Ref Range: 0.0 - 0.50 ug/ml FEU <0.3 <0.3... 0.3 0.3     ECOG PS: 0    ASSESSMENT  This is a 67-year-old patient with a recurrent left leg below-the-knee DVT. The first episode was in 11/2011 in [place], provoked after a prolonged plane ride and it was a calf thrombus adequately treated with 6 months Coumadin. Hypercoagulable workup was done and reviewed and mentioned above. She had a second clot in the same extremity- March 2014. In addition to the left peroneal vein occlusive thrombus, there was probable chronic nonocclusive DVT in the distal femoral vein, and superficial thrombus in the proximal greater saphenous vein. Recent US shows resolution of clot with some chronic findings in left superficial femoral vein. Last ultrasound was in July of 2014. She was switched Aspirin. Repeat ultrasound in 2/15 while off coumadin did not show any clots. Normal protein C and S. Negative PNH. Normal D- dimer.  - Labs were reviewed with the patient. Nomral d-dimer.   - She uses Lovenox 40 mg SQ daily for situation with high risk of thrombosis, surgery, long travel etc.   PLAN  1- RTC MD 1 year  2- Continue Aspirin  3- Labs before next visit- CBC diff, CMP, D-Dimer    Marqiuta Walker MD  8/27/2018    CC: Dr.Christina Gordon    Again, thank you for allowing me to participate in the care of your patient.        Sincerely,        Marquita Walker MD

## 2018-08-27 NOTE — PATIENT INSTRUCTIONS
1- RTC MD 1 year-Scheduled. Lottie WATSON  8/26/19  2- Continue Aspirin  3- Labs before next visit- CBC diff, CMP, N-[id number]-Usomeprsv. Lottie WATSON 8/19/18; labs are ordered  AVS printed and given to Pt. Lottie Auguste, RN, BSN, OCN  [facility]  Patient Care Coordinator

## 2018-10-12 ENCOUNTER — HOSPITAL ENCOUNTER (EMERGENCY)
Facility: CLINIC | Age: 68
Discharge: STILL ADMITTED OR ADMINISTRATIVE | End: 2018-10-12
Attending: EMERGENCY MEDICINE
Payer: COMMERCIAL

## 2018-10-12 VITALS — OXYGEN SATURATION: 93 %

## 2018-10-13 NOTE — ED NOTES
Bed: ED02  Expected date: 10/12/18  Expected time:   Means of arrival:   Comments:  Ambulance: Keyur

## 2019-09-06 DIAGNOSIS — I82.90 VTE (VENOUS THROMBOEMBOLISM): Primary | ICD-10-CM

## 2019-09-13 ENCOUNTER — HOSPITAL ENCOUNTER (OUTPATIENT)
Facility: CLINIC | Age: 69
Setting detail: SPECIMEN
End: 2019-09-13
Attending: INTERNAL MEDICINE

## 2020-03-02 ENCOUNTER — HEALTH MAINTENANCE LETTER (OUTPATIENT)
Age: 70
End: 2020-03-02

## 2020-12-20 ENCOUNTER — HEALTH MAINTENANCE LETTER (OUTPATIENT)
Age: 70
End: 2020-12-20

## 2021-04-24 ENCOUNTER — HEALTH MAINTENANCE LETTER (OUTPATIENT)
Age: 71
End: 2021-04-24

## 2021-10-03 ENCOUNTER — HEALTH MAINTENANCE LETTER (OUTPATIENT)
Age: 71
End: 2021-10-03

## 2022-03-20 ENCOUNTER — HEALTH MAINTENANCE LETTER (OUTPATIENT)
Age: 72
End: 2022-03-20

## 2022-05-15 ENCOUNTER — HEALTH MAINTENANCE LETTER (OUTPATIENT)
Age: 72
End: 2022-05-15

## 2022-09-10 ENCOUNTER — HEALTH MAINTENANCE LETTER (OUTPATIENT)
Age: 72
End: 2022-09-10

## 2023-06-03 ENCOUNTER — HEALTH MAINTENANCE LETTER (OUTPATIENT)
Age: 73
End: 2023-06-03
